# Patient Record
Sex: MALE | Race: WHITE | NOT HISPANIC OR LATINO | URBAN - METROPOLITAN AREA
[De-identification: names, ages, dates, MRNs, and addresses within clinical notes are randomized per-mention and may not be internally consistent; named-entity substitution may affect disease eponyms.]

---

## 2021-10-27 ENCOUNTER — TELEPHONE (OUTPATIENT)
Dept: UROLOGY | Facility: CLINIC | Age: 66
End: 2021-10-27

## 2023-10-13 NOTE — PROGRESS NOTES
10/16/2023      No chief complaint on file. Assessment and Plan    79 y.o. male -- New patient    1. Prostate cancer, [place] 6  - Patient had prostate biopsy in [facility] (4/23/18) for a PSA of 4.3  - PSA (2/16/22) 6.8 when corrected for finasteride  - PSA (9/19/23) 12.50 when corrected for finasteride  - Denies confirmatory biopsy or prostate MRI  - AUDRA today benign  - Repeat PSA  - MRI of prostate for staging and prostate biopsy planning. Fusion guided prostate biopsy vs TRUS biopsy based on MRI findings  - Follow up to review  - Call with any questions or concerns in the meantime  - All questions answered; patient understands and agrees with plan       History of Present Illness  Ilia Constantino is a 79 y.o. male new patient here for evaluation of elevated PSA. Present with daughter for today's visit who provides translation. Patient had elevated PSA in 2018 of 4.3. Patient underwent prostate biopsy in [place] showing 1 core less than 5% positive for [place] 6 prostate cancer. Patient was given the options of active surveillance versus radiation versus prostatectomy. Due to low volume [place] 6, recommendations were to proceed with active surveillance. Patient has been having routine PSA since. Denies confirmatory biopsy or prior MRI of prostate. PSA from 2022 was 6.8 when corrected for finasteride. Most recent PSA 12.50 when corrected for finasteride. Denies gross hematuria, dysuria, flank pain, suprapubic pressure, unexpected weight loss or bone pain. Does have some mild LUTS. Currently taking Flomax and finasteride. Denies family history of  malignancies. Review of Systems   Constitutional:  Negative for activity change, appetite change, chills and fever. HENT:  Negative for congestion and trouble swallowing. Respiratory:  Negative for cough and shortness of breath. Cardiovascular:  Negative for chest pain, palpitations and leg swelling.    Gastrointestinal:  Negative for abdominal pain, constipation, diarrhea, nausea and vomiting. Genitourinary:  Negative for difficulty urinating, dysuria, flank pain, frequency, hematuria and urgency. Musculoskeletal:  Negative for back pain and gait problem. Skin:  Negative for wound. Allergic/Immunologic: Negative for immunocompromised state. Neurological:  Negative for dizziness and syncope. Hematological:  Does not bruise/bleed easily. Psychiatric/Behavioral:  Negative for confusion. All other systems reviewed and are negative. Vitals  Vitals:    10/16/23 1424   BP: 138/78   Pulse: 71   SpO2: 98%   Weight: 105 kg (231 lb)   Height: 5' 11" (1.803 m)       Physical Exam  Constitutional:       General: He is not in acute distress. Appearance: Normal appearance. He is not ill-appearing, toxic-appearing or diaphoretic. HENT:      Head: Normocephalic. Nose: No congestion. Eyes:      General: No scleral icterus. Right eye: No discharge. Left eye: No discharge. Conjunctiva/sclera: Conjunctivae normal.      Pupils: Pupils are equal, round, and reactive to light. Pulmonary:      Effort: Pulmonary effort is normal.   Genitourinary:     Comments: Prostate smooth, symmetrical, no palpable nodules    Musculoskeletal:      Cervical back: Normal range of motion. Skin:     General: Skin is warm and dry. Coloration: Skin is not jaundiced or pale. Findings: No bruising, erythema, lesion or rash. Neurological:      General: No focal deficit present. Mental Status: He is alert and oriented to person, place, and time. Mental status is at baseline. Gait: Gait normal.   Psychiatric:         Mood and Affect: Mood normal.         Behavior: Behavior normal.         Thought Content: Thought content normal.         Judgment: Judgment normal.           Past History  History reviewed. No pertinent past medical history.   Social History     Socioeconomic History    Marital status: /Civil Union Spouse name: None    Number of children: None    Years of education: None    Highest education level: None   Occupational History    None   Tobacco Use    Smoking status: Never     Passive exposure: Past    Smokeless tobacco: Never   Vaping Use    Vaping Use: Never used   Substance and Sexual Activity    Alcohol use: Yes     Comment: occ    Drug use: Never    Sexual activity: Not Currently     Partners: Female   Other Topics Concern    None   Social History Narrative    None     Social Determinants of Health     Financial Resource Strain: Not on file   Food Insecurity: Not on file   Transportation Needs: Not on file   Physical Activity: Not on file   Stress: Not on file   Social Connections: Not on file   Intimate Partner Violence: Not on file   Housing Stability: Not on file     Social History     Tobacco Use   Smoking Status Never    Passive exposure: Past   Smokeless Tobacco Never     Family History   Problem Relation Age of Onset    Colon cancer Father     No Known Problems Mother     Hypertension Brother     Hypertension Sister     Hypertension Sister     Thyroid disease Daughter     No Known Problems Daughter     No Known Problems Son        The following portions of the patient's history were reviewed and updated as appropriate: allergies, current medications, past medical history, past social history, past surgical history and problem list.    Results  No results found for this or any previous visit (from the past 1 hour(s)). ]  No results found for: "PSA"  No results found for: "GLUCOSE", "CALCIUM", "NA", "K", "CO2", "CL", "BUN", "CREATININE"  No results found for: "WBC", "HGB", "HCT", "MCV", "PLT"    Destiney Neff PA-C

## 2023-10-16 ENCOUNTER — OFFICE VISIT (OUTPATIENT)
Dept: UROLOGY | Facility: CLINIC | Age: 68
End: 2023-10-16
Payer: COMMERCIAL

## 2023-10-16 VITALS
SYSTOLIC BLOOD PRESSURE: 138 MMHG | HEIGHT: 71 IN | WEIGHT: 231 LBS | OXYGEN SATURATION: 98 % | BODY MASS INDEX: 32.34 KG/M2 | HEART RATE: 71 BPM | DIASTOLIC BLOOD PRESSURE: 78 MMHG

## 2023-10-16 DIAGNOSIS — C61 PROSTATE CANCER (HCC): Primary | ICD-10-CM

## 2023-10-16 PROCEDURE — 99204 OFFICE O/P NEW MOD 45 MIN: CPT | Performed by: PHYSICIAN ASSISTANT

## 2023-10-16 RX ORDER — FINASTERIDE 5 MG/1
5 TABLET, FILM COATED ORAL DAILY
COMMUNITY

## 2023-10-16 RX ORDER — CANDESARTAN CILEXETIL AND HYDROCHLOROTHIAZIDE 32; 12.5 MG/1; MG/1
1 TABLET ORAL DAILY
COMMUNITY
Start: 2023-09-26

## 2023-10-16 RX ORDER — AMLODIPINE BESYLATE 10 MG/1
10 TABLET ORAL DAILY
COMMUNITY
Start: 2023-09-25

## 2023-10-16 RX ORDER — ATENOLOL 25 MG/1
25 TABLET ORAL DAILY
COMMUNITY
Start: 2023-09-15

## 2023-10-17 ENCOUNTER — TELEPHONE (OUTPATIENT)
Age: 68
End: 2023-10-17

## 2023-10-17 ENCOUNTER — TRANSCRIBE ORDERS (OUTPATIENT)
Age: 68
End: 2023-10-17

## 2023-10-17 DIAGNOSIS — Z12.11 ENCOUNTER FOR SCREENING COLONOSCOPY: Primary | ICD-10-CM

## 2023-11-03 LAB — PSA SERPL-MCNC: 6.35 NG/ML

## 2023-11-09 ENCOUNTER — HOSPITAL ENCOUNTER (OUTPATIENT)
Dept: RADIOLOGY | Age: 68
Discharge: HOME | End: 2023-11-09
Payer: COMMERCIAL

## 2023-11-09 DIAGNOSIS — C61 PROSTATE CANCER (HCC): ICD-10-CM

## 2023-11-09 PROCEDURE — A9585 GADOBUTROL INJECTION: HCPCS | Performed by: PHYSICIAN ASSISTANT

## 2023-11-09 PROCEDURE — 72197 MRI PELVIS W/O & W/DYE: CPT

## 2023-11-09 PROCEDURE — G1004 CDSM NDSC: HCPCS

## 2023-11-09 PROCEDURE — 76377 3D RENDER W/INTRP POSTPROCES: CPT

## 2023-11-09 RX ORDER — GADOBUTROL 604.72 MG/ML
10 INJECTION INTRAVENOUS
Status: COMPLETED | OUTPATIENT
Start: 2023-11-09 | End: 2023-11-09

## 2023-11-09 RX ADMIN — GADOBUTROL 10 ML: 604.72 INJECTION INTRAVENOUS at 15:26

## 2023-11-20 NOTE — PROGRESS NOTES
11/21/2023      Chief Complaint   Patient presents with    Follow-up         Assessment and Plan    79 y.o. male     1. Prostate cancer, James 6  - Patient had prostate biopsy in [facility] (4/23/18) for a PSA of 4.3  - PSA (2/16/22) 6.8 when corrected for finasteride  - PSA (9/19/23) 12.50 when corrected for finasteride  - MRI of prostate (11/9/23) PIRADS 2 with 84 g prostate  - Most recent PSA (11/2/23) 12.70 when corrected for finasteride  - Recommend confirmatory TRUS biopsy given negative MRI and rising PSA. Patient does not take blood thinners. Enema prior to biopsy. Discussed risks and benefit     2. LUTS  - Continue finasteride  -  Trial of Flomax. Prescription sent to pharmacy  - Call with any questions or concerns in the meantime  - All questions answered; patient understands and agrees with plan       History of Present Illness  Holly Oneill is a 79 y.o. male patient with history of James 6 prostate cancer here for follow up. Present with daughter for today's visit who provides translation. Patient had elevated PSA in 2018 of 4.3. Patient underwent prostate biopsy in [place] showing 1 core less than 5% positive for [place] 6 prostate cancer. Patient was given the options of active surveillance versus radiation versus prostatectomy. Due to low volume [place] 6, recommendations were to proceed with active surveillance. Patient has been having routine PSA since. Denies confirmatory biopsy or prior MRI of prostate. PSA from 2022 was 6.8 when corrected for finasteride. Most recent PSA 12.7 when corrected for finasteride. MRI of prostate negative. Currently taking finasteride. Previously tried Flomax with some benefit. Continues to have post void dribbling and would like to restart Flomax. Denies family history of  malignancies. Review of Systems   Constitutional:  Negative for activity change, appetite change, chills and fever. HENT:  Negative for congestion and trouble swallowing. Respiratory:  Negative for cough and shortness of breath. Cardiovascular:  Negative for chest pain, palpitations and leg swelling. Gastrointestinal:  Negative for abdominal pain, constipation, diarrhea, nausea and vomiting. Genitourinary:  Negative for difficulty urinating, dysuria, flank pain, frequency, hematuria and urgency. Musculoskeletal:  Negative for back pain and gait problem. Skin:  Negative for wound. Allergic/Immunologic: Negative for immunocompromised state. Neurological:  Negative for dizziness and syncope. Hematological:  Does not bruise/bleed easily. Psychiatric/Behavioral:  Negative for confusion. All other systems reviewed and are negative. Vitals  Vitals:    11/21/23 1353   BP: 126/70   Pulse: 63   Resp: 16   SpO2: 93%   Weight: 96.2 kg (212 lb)   Height: 5' 11" (1.803 m)       Physical Exam  Constitutional:       General: He is not in acute distress. Appearance: Normal appearance. He is not ill-appearing, toxic-appearing or diaphoretic. HENT:      Head: Normocephalic. Nose: No congestion. Eyes:      General: No scleral icterus. Right eye: No discharge. Left eye: No discharge. Conjunctiva/sclera: Conjunctivae normal.      Pupils: Pupils are equal, round, and reactive to light. Pulmonary:      Effort: Pulmonary effort is normal.   Musculoskeletal:      Cervical back: Normal range of motion. Skin:     General: Skin is warm and dry. Coloration: Skin is not jaundiced or pale. Findings: No bruising, erythema, lesion or rash. Neurological:      General: No focal deficit present. Mental Status: He is alert and oriented to person, place, and time. Mental status is at baseline. Gait: Gait normal.   Psychiatric:         Mood and Affect: Mood normal.         Behavior: Behavior normal.         Thought Content: Thought content normal.         Judgment: Judgment normal.           Past History  History reviewed.  No pertinent past medical history.   Social History     Socioeconomic History    Marital status: /Civil Union     Spouse name: None    Number of children: None    Years of education: None    Highest education level: None   Occupational History    None   Tobacco Use    Smoking status: Never     Passive exposure: Past    Smokeless tobacco: Never   Vaping Use    Vaping Use: Never used   Substance and Sexual Activity    Alcohol use: Yes     Comment: occ    Drug use: Never    Sexual activity: Not Currently     Partners: Female   Other Topics Concern    None   Social History Narrative    None     Social Determinants of Health     Financial Resource Strain: Not on file   Food Insecurity: Not on file   Transportation Needs: Not on file   Physical Activity: Not on file   Stress: Not on file   Social Connections: Not on file   Intimate Partner Violence: Not on file   Housing Stability: Not on file     Social History     Tobacco Use   Smoking Status Never    Passive exposure: Past   Smokeless Tobacco Never     Family History   Problem Relation Age of Onset    Colon cancer Father     No Known Problems Mother     Hypertension Brother     Hypertension Sister     Hypertension Sister     Thyroid disease Daughter     No Known Problems Daughter     No Known Problems Son        The following portions of the patient's history were reviewed and updated as appropriate: allergies, current medications, past medical history, past social history, past surgical history and problem list.    Results  No results found for this or any previous visit (from the past 1 hour(s)).]  Lab Results   Component Value Date    PSA 6.35 (H) 11/02/2023     No results found for: "GLUCOSE", "CALCIUM", "NA", "K", "CO2", "CL", "BUN", "CREATININE"  No results found for: "WBC", "HGB", "HCT", "MCV", "PLT"    Jordyn Bullock PA-C

## 2023-11-21 ENCOUNTER — OFFICE VISIT (OUTPATIENT)
Dept: UROLOGY | Facility: CLINIC | Age: 68
End: 2023-11-21
Payer: COMMERCIAL

## 2023-11-21 VITALS
WEIGHT: 212 LBS | HEART RATE: 63 BPM | RESPIRATION RATE: 16 BRPM | BODY MASS INDEX: 29.68 KG/M2 | OXYGEN SATURATION: 93 % | DIASTOLIC BLOOD PRESSURE: 70 MMHG | SYSTOLIC BLOOD PRESSURE: 126 MMHG | HEIGHT: 71 IN

## 2023-11-21 DIAGNOSIS — N40.1 BENIGN PROSTATIC HYPERPLASIA WITH LOWER URINARY TRACT SYMPTOMS, SYMPTOM DETAILS UNSPECIFIED: Primary | ICD-10-CM

## 2023-11-21 PROCEDURE — 99213 OFFICE O/P EST LOW 20 MIN: CPT | Performed by: PHYSICIAN ASSISTANT

## 2023-11-21 RX ORDER — TAMSULOSIN HYDROCHLORIDE 0.4 MG/1
0.4 CAPSULE ORAL
Qty: 90 CAPSULE | Refills: 3 | Status: SHIPPED | OUTPATIENT
Start: 2023-11-21

## 2023-11-21 RX ORDER — FINASTERIDE 5 MG/1
5 TABLET, FILM COATED ORAL DAILY
Qty: 90 TABLET | Refills: 3 | Status: SHIPPED | OUTPATIENT
Start: 2023-11-21

## 2024-02-28 ENCOUNTER — PROCEDURE VISIT (OUTPATIENT)
Dept: UROLOGY | Facility: CLINIC | Age: 69
End: 2024-02-28
Payer: COMMERCIAL

## 2024-02-28 VITALS
BODY MASS INDEX: 30.1 KG/M2 | HEIGHT: 71 IN | DIASTOLIC BLOOD PRESSURE: 82 MMHG | TEMPERATURE: 96.8 F | RESPIRATION RATE: 16 BRPM | SYSTOLIC BLOOD PRESSURE: 127 MMHG | OXYGEN SATURATION: 97 % | WEIGHT: 215 LBS | HEART RATE: 70 BPM

## 2024-02-28 DIAGNOSIS — C61 PROSTATE CANCER (HCC): Primary | ICD-10-CM

## 2024-02-28 PROCEDURE — 88344 IMHCHEM/IMCYTCHM EA MLT ANTB: CPT | Performed by: STUDENT IN AN ORGANIZED HEALTH CARE EDUCATION/TRAINING PROGRAM

## 2024-02-28 PROCEDURE — G0416 PROSTATE BIOPSY, ANY MTHD: HCPCS | Performed by: STUDENT IN AN ORGANIZED HEALTH CARE EDUCATION/TRAINING PROGRAM

## 2024-02-28 PROCEDURE — 96372 THER/PROPH/DIAG INJ SC/IM: CPT

## 2024-02-28 PROCEDURE — 52000 CYSTOURETHROSCOPY: CPT | Performed by: UROLOGY

## 2024-02-28 RX ORDER — CEFTRIAXONE 1 G/1
1000 INJECTION, POWDER, FOR SOLUTION INTRAMUSCULAR; INTRAVENOUS ONCE
Status: COMPLETED | OUTPATIENT
Start: 2024-02-28 | End: 2024-02-28

## 2024-02-28 RX ADMIN — CEFTRIAXONE 1000 MG: 1 INJECTION, POWDER, FOR SOLUTION INTRAMUSCULAR; INTRAVENOUS at 14:56

## 2024-02-28 NOTE — PATIENT INSTRUCTIONS
Transrectal prostate biopsy post-procedure instructions    You had a transrectal prostate biopsy today. We took tissue biopsy cores of your prostate through multiple needle pokes that went through your rectal mucosa directly into your prostate.    It is normal to have blood in your urine, semen, and stool for the next few days to weeks. Please call office if any of this bleeding is constant or high volume. Also call office if you are passing blood clots in your urine and/or you are not able to urinate.    Some men can have erectile dysfunction for a few weeks after the procedure. If you experience this issue, do not be alarmed. If problem persists after a month or so, let your provider know.    There is a low, but non-zero risk of developing a serious infection after this procedure. You received a strong antibiotic before the procedure to minimize this risk. If you experience fevers, chills, nausea, vomiting, or any other concerning symptoms, call the office or present to ED right away.    Diet    You may resume your regular diet after the procedure.    Please stay hydrated and drink plenty of fluids.    Activity    For the next few days you should try to take it easy and avoid lifting anything heavier than carton of milk. Avoid long car rides for the next few days. Please do your best to not strain when urinating or having a bowel movement, as this can make bleeding worse. Take over the counter stool softener if needed.    For the next few weeks, please do not do anything that puts extra pressure on your perineum. These activities include but are not limited to: bike riding, motorcycle riding, strenuous running/jumping/lifting exercises, horse riding.    About 1 week after you experience no bleeding in urine and stool, you can start to run and do light weight exercises.    It is best to avoid sex or ejaculation for about 1 week after procedure. The first time you ejaculate, you may see a lot of blood in the semen.  Do not be alarmed. This is normal. In subsequent ejaculations, you should see less and less blood. Call office if you have concerns.    Walking is okay and is encouraged after your procedure. Try to not to do too much movement the day of your procedure, but starting the day after procedure, please try to move around.    Hygiene    You can shower as normal starting the day of your procedure. Avoid baths/hot tubs/pools/standing body of water for 5 days after procedure.    If you are having some spot bleeding from your rectum after procedure, you can place gauze or protective pad in your underwear.    Medications    Take over the counter Tylenol as needed for pain or discomfort.    Take all of your other pre-existing medications as scheduled.    Follow-up    You will follow up in the office on 3/15/24 to review your results.

## 2024-02-28 NOTE — PROGRESS NOTES
"  69M with CaP    No AC    Takes Flomax and finasteride for LUTS    Patient had prostate biopsy in [place] (4/23/18) for a PSA of 4.3. Came back G 3+3.    PSA (2/16/22) 6.8 when corrected for finasteride     PSA (9/19/23) 12.50 when corrected for finasteride     MRI of prostate (11/9/23) PIRADS 2 with 84 g prostate     PSA (11/2/23) 12.70 when corrected for finasteride              Cystoscopy     Date/Time  2/28/2024 3:00 PM     Performed by  Adarsh Trevino DO   Authorized by  Adarsh Trevino DO     Universal Protocol:  Consent: Verbal consent obtained. Written consent obtained.  Risks and benefits: risks, benefits and alternatives were discussed  Consent given by: patient  Time out: Immediately prior to procedure a \"time out\" was called to verify the correct patient, procedure, equipment, support staff and site/side marked as required.  Timeout called at: 2/28/2024 3:08 PM.  Patient understanding: patient states understanding of the procedure being performed  Patient consent: the patient's understanding of the procedure matches consent given  Procedure consent: procedure consent matches procedure scheduled  Relevant documents: relevant documents present and verified  Required items: required blood products, implants, devices, and special equipment available  Patient identity confirmed: verbally with patient      Procedure Details:  Procedure type: cystoscopy    Patient tolerance: Patient tolerated the procedure well with no immediate complications    Additional Procedure Details: Office TRUS-guided Prostate Biopsy Procedure Note    Indication    Active surveillance of prostate cancer    Informed consent   The risks, benefits and alternatives to this procedures were discussed with the patient and he has participated in the informed consent process and wishes to proceed    Antibiotic prophylaxis   Ancef    Rectal cleansing  The patient was instructed to perform an evacuating rectal enema 1-2 hours prior to " biopsy.    Local anesthesia  Topical 2% lidocaine jelly was applied liberally to the anus and rectum and allowed to dwell for at least 5 min prior to starting the procedure.  After insertion of the TRUS probe, 10 mL of 2% lidocaine solution was injected with ultrasound guidance at the  junction of the prostate and seminal vesicles. The anesthetic was allowed to dwell for at least 2 minutes prior to biopsy.    Transrectal ultrasonography  The patient was placed in the left lateral decubitus position. After an attentive digital rectal examination, a 7.5 mHz sidefire ultrasound probe was gently inserted into the rectum and biplanar imaging of the prostate was done with the findings noted below. Images were taken of any abnormal findings and also to document prostate size.    Bladder  The bladder base appeared normal.    Prostate      Ultrasound size measurements:  -Volume:  63.52 cm3    Ultrasound findings:  -Cysts: None  -Masses: None  -Median lobe: absent    Clinical stage (assuming a positive biopsy):   -T1c.    TRUS-guided needle biopsy  Using an 18 gauge biopsy needle and ultrasound guidance, the following biopsies were taken:    1 core(s) from the left lateral base.  1 core(s) from the left lateral mid-gland.  1 core(s) from the right middle base.  1 core(s) from the right lateral base.  1 core(s) from the left lateral mid-gland.  1 core(s) from the left middle mid-gland.  1 core(s) from the right middle mid-gland.  1 core(s) from the right lateral mid-gland.  1 core(s) from the left lateral apex.  1 core(s) from the left middle apex.  1 core(s) from the right middle apex.  1 core(s) from the right lateral apex    Total number of cores: 12                Complications  There were no procedural complications.    Disposition  The patient was dismissed to home     Post-procedure instructions:     Today he underwent an uncomplicated transrectal ultrasound-guided biopsy of the prostate, following a bo-prostatic  nerve block.I reviewed the normal post-procedure course including bleeding per rectum, hematuria, and hematospermia.  . Instructed him to call with fever greater than 101, chills, nausea, vomiting, and poorly controlled pain. His followup was scheduled in 2 to 4 weeks' time to review the pathology.                        PLAN  -Sees me for path review 3/15/24. Will also discuss voiding and Flomax/finasteride at that time.

## 2024-03-01 PROCEDURE — G0416 PROSTATE BIOPSY, ANY MTHD: HCPCS | Performed by: STUDENT IN AN ORGANIZED HEALTH CARE EDUCATION/TRAINING PROGRAM

## 2024-03-01 PROCEDURE — 88344 IMHCHEM/IMCYTCHM EA MLT ANTB: CPT | Performed by: STUDENT IN AN ORGANIZED HEALTH CARE EDUCATION/TRAINING PROGRAM

## 2024-03-15 ENCOUNTER — OFFICE VISIT (OUTPATIENT)
Dept: UROLOGY | Facility: CLINIC | Age: 69
End: 2024-03-15
Payer: COMMERCIAL

## 2024-03-15 VITALS
HEIGHT: 71 IN | WEIGHT: 215 LBS | HEART RATE: 61 BPM | BODY MASS INDEX: 30.1 KG/M2 | OXYGEN SATURATION: 98 % | TEMPERATURE: 97.5 F | SYSTOLIC BLOOD PRESSURE: 122 MMHG | DIASTOLIC BLOOD PRESSURE: 68 MMHG

## 2024-03-15 DIAGNOSIS — C61 PROSTATE CANCER (HCC): Primary | ICD-10-CM

## 2024-03-15 PROCEDURE — 99214 OFFICE O/P EST MOD 30 MIN: CPT | Performed by: UROLOGY

## 2024-03-15 NOTE — PROGRESS NOTES
"UROLOGY FOLLOW-UP ENCOUNTER    Jason Aguero is a 69 y.o. male with prostate cancer    No AC     Takes Flomax and finasteride for LUTS     Patient had prostate biopsy in [place] (4/23/18) for a PSA of 4.3. Came back G 3+3.     PSA (2/16/22) 6.8 when corrected for finasteride      PSA (9/19/23) 12.50 when corrected for finasteride      MRI of prostate (11/9/23) PIRADS 2 with 84 g prostate      PSA (11/2/23) 12.70 when corrected for finasteride     TRUS Pbx 2/28/24: no cancer      Assessment and plan:     Prostate cancer    Patient with prostate cancer on active surveillance.  He had [place] 6 from biopsy in 2018.    We reviewed his most recent prostate biopsy results.  Explained that none of the Demetrius came back for cancer.  I explained that even though he did not have positive cancer his biopsies, it does not mean that he does not still have prostate cancer.  We will therefore keep him on the active surveillance protocol as is standard for low risk prostate cancer.    We discussed possibly stretching out his interval surveillance since he has been negative for progression 2018.  Since I will plan on doing a PSA at the 6-month interval, which will be roughly June or July 2024.  After that, we will plan on doing prostate MRI 6 months after that, followed by PSA in 6 months, followed by another prostate biopsy.  This will therefore put him on a regimen of getting a prostate biopsy every other year rather than every year.          PLAN  -He will come back to clinic with PSA in about 4 months.  We will then plan on getting prostate MRI and PSA about 6 months after that. He will then get PSA 6 months later, followed by PSA and repeat TRUS biopsy 6 months later.  -Cont Flomax and finasteride      Patient's daughter, Talya, present in office today and assisted with history      Portions of the above record have been created with voice recognition software.  Occasional wrong word or \"sound alike\" substitution may have occurred due " "to the inherent limitations of voice recognition software.  Read the chart carefully and recognize, using context, where substitution may have occurred.      Adarsh Trevino DO        Chief Complaint     Prostate cancer      History of Present Illness     See summary above    Feeling well since biopsy      The following portions of the patient's history were reviewed and updated as appropriate: allergies, current medications, past family history, past medical history, past social history, past surgical history and problem list.          Review of Systems     Review of Systems   Constitutional:  Negative for chills and fever.   Respiratory:  Negative for cough and shortness of breath.    Genitourinary:  Negative for dysuria and hematuria.   Neurological:  Negative for dizziness and headaches.   Psychiatric/Behavioral:  Negative for agitation and behavioral problems.            Allergies     No Known Allergies    Physical Exam     Physical Exam  Constitutional:       General: He is not in acute distress.  HENT:      Head: Normocephalic and atraumatic.   Pulmonary:      Effort: Pulmonary effort is normal. No respiratory distress.   Abdominal:      General: Abdomen is flat.      Palpations: Abdomen is soft.      Tenderness: There is no right CVA tenderness or left CVA tenderness.   Skin:     General: Skin is warm and dry.   Neurological:      General: No focal deficit present.      Mental Status: He is alert and oriented to person, place, and time.   Psychiatric:         Mood and Affect: Mood normal.         Behavior: Behavior normal.             Vital Signs  Vitals:    03/15/24 1400   BP: 122/68   Pulse: 61   Temp: 97.5 °F (36.4 °C)   TempSrc: Temporal   SpO2: 98%   Weight: 97.5 kg (215 lb)   Height: 5' 11\" (1.803 m)         Current Medications       Current Outpatient Medications:     amLODIPine (NORVASC) 10 mg tablet, Take 10 mg by mouth daily, Disp: , Rfl:     finasteride (PROSCAR) 5 mg tablet, Take 1 tablet " (5 mg total) by mouth daily, Disp: 90 tablet, Rfl: 3    tamsulosin (FLOMAX) 0.4 mg, Take 1 capsule (0.4 mg total) by mouth daily with dinner, Disp: 90 capsule, Rfl: 3    atenolol (TENORMIN) 25 mg tablet, Take 25 mg by mouth daily (Patient not taking: Reported on 10/16/2023), Disp: , Rfl:     candesartan-hydrochlorothiazide (ATACAND HCT) 32-12.5 MG per tablet, Take 1 tablet by mouth daily (Patient not taking: Reported on 10/16/2023), Disp: , Rfl:       Active Problems     There is no problem list on file for this patient.        Past Medical History     History reviewed. No pertinent past medical history.      Surgical History     History reviewed. No pertinent surgical history.      Family History     Family History   Problem Relation Age of Onset    Colon cancer Father     No Known Problems Mother     Hypertension Brother     Hypertension Sister     Hypertension Sister     Thyroid disease Daughter     No Known Problems Daughter     No Known Problems Son          Social History     Social History     Social History     Tobacco Use   Smoking Status Never    Passive exposure: Past   Smokeless Tobacco Never         Pertinent Lab Values     Lab Results   Component Value Date    CREATININE 0.83 02/16/2022       Lab Results   Component Value Date    PSA 6.35 (H) 11/02/2023               Pertinent Imaging     The patient's images were reviewed by me personally and also in real time with them in the examination room using our PACS imaging system.            Pertinent Pathology     TRUS Pbx 2/28/24: no cancer  A. Prostate, LLB:  -   Benign prostatic tissue.      B. Prostate, LLM:  -   Benign prostatic tissue.      C. Prostate, LLA:  -   Benign prostatic tissue mild mixed inflammation.     D. Prostate, LB:  -   Benign prostatic tissue mild mixed inflammation.     E. Prostate, LM:  -   Small atypical acinar proliferation, favor partial atrophy, see comment.     Comment: Immunohistochemistry for a prostate multiplex stain (p63,  , and P504S) demonstrates a rare single gland without staining (negative for p63//P504S) this small focus is favored to represent partial atrophy.         F. Prostate, LA:  -   Benign prostatic tissue with chronic inflammation.      Comment: Immunohistochemistry for a prostate multiplex stain (p63, , and P504S) demonstrates benign epithelial elements.     G. Prostate, RB:  -   Benign prostatic tissue with acute and chronic inflammation.     Comment: Immunohistochemistry for a prostate multiplex stain (p63, , and P504S) demonstrates benign epithelial elements.     H. Prostate, RM:  -   Benign prostatic tissue mild mixed inflammation.     Comment: Immunohistochemistry for a prostate multiplex stain (p63, , and P504S) demonstrates benign epithelial elements.     I. Prostate, RA:  -   Benign prostatic tissue with acute and chronic inflammation.      J. Prostate, RLB:  -   Benign prostatic tissue with chronic inflammation.       K. Prostate, RLM:  -   Benign prostatic tissue.       L. Prostate, RLA:  -   Benign prostatic tissue with chronic inflammation.         I have spent 30 minutes with Patient and family today in which greater than 50% of this time was spent in counseling/coordination of care regarding Diagnostic results, Prognosis, Risks and benefits of tx options, Instructions for management, Patient and family education, Importance of tx compliance, Impressions, Counseling / Coordination of care, Documenting in the medical record, Reviewing / ordering tests, medicine, procedures  , and Obtaining or reviewing history  .    Please note this time includes cumulative time on the day of encounter, including reviewing medical records and/or coordinating care among the patient's other specialists.

## 2024-07-10 LAB — PSA SERPL-MCNC: 4.53 NG/ML

## 2024-07-12 NOTE — PROGRESS NOTES
7/15/2024      Chief Complaint   Patient presents with   • Follow-up         Assessment and Plan    69 y.o. male     1. Prostate cancer, [place] 6  - Patient had prostate biopsy in [place] (4/23/18) for a PSA of 4.3  - mpMRI November 2023 negative PIRADS 2  - prostate biopsy 2/28/24 negative  - PSA (7/9/24) 9 when corrected for finasteride  - Follow up in 6 months with PSA and MRI prior to visit. AUDRA at next visit. Will repeat TRUS biopsy and another PSA 6 months later   - Call with any questions or concerns in the meantime  - All questions answered; patient understands and agrees with plan       History of Present Illness  Jason Aguero is a 69 y.o. male patient with history of [place] 6 prostate cancer on active surveillance here for follow up.     Present with daughter for today's visit who provides translation.     Patient had elevated PSA in 2018 of 4.3.  Patient underwent prostate biopsy in [place] showing 1 core less than 5% positive for [place] 6 prostate cancer.  Patient was given the options of active surveillance versus radiation versus prostatectomy.  Due to low volume [place] 6, recommendations were to proceed with active surveillance.  Patient has been having routine PSA since.  patient had negative MRI in November 2023, however, persistent rise in PSA. Underwent prostate biopsy in February 2024 which was negative. Presents today with PSA of 9. Will continue active surveillance.     Review of Systems   Constitutional:  Negative for activity change, appetite change, chills and fever.   HENT:  Negative for congestion and trouble swallowing.    Respiratory:  Negative for cough and shortness of breath.    Cardiovascular:  Negative for chest pain, palpitations and leg swelling.   Gastrointestinal:  Negative for abdominal pain, constipation, diarrhea, nausea and vomiting.   Genitourinary:  Negative for difficulty urinating, dysuria, flank pain, frequency, hematuria and urgency.   Musculoskeletal:  Negative for  "back pain and gait problem.   Skin:  Negative for wound.   Allergic/Immunologic: Negative for immunocompromised state.   Neurological:  Negative for dizziness and syncope.   Hematological:  Does not bruise/bleed easily.   Psychiatric/Behavioral:  Negative for confusion.    All other systems reviewed and are negative.      Vitals  Vitals:    07/15/24 1402   BP: 126/74   Pulse: 65   Temp: 98.2 °F (36.8 °C)   TempSrc: Temporal   SpO2: 99%   Weight: 98.4 kg (217 lb)   Height: 5' 11\" (1.803 m)       Physical Exam  Constitutional:       General: He is not in acute distress.     Appearance: Normal appearance. He is not ill-appearing, toxic-appearing or diaphoretic.   HENT:      Head: Normocephalic.      Nose: No congestion.   Eyes:      General: No scleral icterus.        Right eye: No discharge.         Left eye: No discharge.      Conjunctiva/sclera: Conjunctivae normal.      Pupils: Pupils are equal, round, and reactive to light.   Pulmonary:      Effort: Pulmonary effort is normal.   Musculoskeletal:      Cervical back: Normal range of motion.   Skin:     General: Skin is warm and dry.      Coloration: Skin is not jaundiced or pale.      Findings: No bruising, erythema, lesion or rash.   Neurological:      General: No focal deficit present.      Mental Status: He is alert and oriented to person, place, and time. Mental status is at baseline.      Gait: Gait normal.   Psychiatric:         Mood and Affect: Mood normal.         Behavior: Behavior normal.         Thought Content: Thought content normal.         Judgment: Judgment normal.         Past History  History reviewed. No pertinent past medical history.  Social History     Socioeconomic History   • Marital status: /Civil Union     Spouse name: None   • Number of children: None   • Years of education: None   • Highest education level: None   Occupational History   • None   Tobacco Use   • Smoking status: Never     Passive exposure: Past   • Smokeless tobacco: " "Never   Vaping Use   • Vaping status: Never Used   Substance and Sexual Activity   • Alcohol use: Yes     Comment: occ   • Drug use: Never   • Sexual activity: Not Currently     Partners: Female   Other Topics Concern   • None   Social History Narrative   • None     Social Determinants of Health     Financial Resource Strain: Not on file   Food Insecurity: Not on file   Transportation Needs: Not on file   Physical Activity: Not on file   Stress: Not on file   Social Connections: Not on file   Intimate Partner Violence: Not on file   Housing Stability: Not on file     Social History     Tobacco Use   Smoking Status Never   • Passive exposure: Past   Smokeless Tobacco Never     Family History   Problem Relation Age of Onset   • Colon cancer Father    • No Known Problems Mother    • Hypertension Brother    • Hypertension Sister    • Hypertension Sister    • Thyroid disease Daughter    • No Known Problems Daughter    • No Known Problems Son        The following portions of the patient's history were reviewed and updated as appropriate: allergies, current medications, past medical history, past social history, past surgical history and problem list.    Results  No results found for this or any previous visit (from the past 1 hour(s)).]  Lab Results   Component Value Date    PSA 4.53 (H) 07/09/2024    PSA 6.35 (H) 11/02/2023     Lab Results   Component Value Date    CALCIUM 9.2 02/16/2022    K 4.4 02/16/2022    CO2 26 02/16/2022     02/16/2022    BUN 17 02/16/2022    CREATININE 0.83 02/16/2022     No results found for: \"WBC\", \"HGB\", \"HCT\", \"MCV\", \"PLT\"    Shaila Carballo PA-C  "

## 2024-07-15 ENCOUNTER — OFFICE VISIT (OUTPATIENT)
Dept: UROLOGY | Facility: CLINIC | Age: 69
End: 2024-07-15
Payer: COMMERCIAL

## 2024-07-15 VITALS
OXYGEN SATURATION: 99 % | SYSTOLIC BLOOD PRESSURE: 126 MMHG | DIASTOLIC BLOOD PRESSURE: 74 MMHG | BODY MASS INDEX: 30.38 KG/M2 | TEMPERATURE: 98.2 F | HEIGHT: 71 IN | HEART RATE: 65 BPM | WEIGHT: 217 LBS

## 2024-07-15 DIAGNOSIS — C61 PROSTATE CANCER (HCC): Primary | ICD-10-CM

## 2024-07-15 DIAGNOSIS — N40.1 BENIGN PROSTATIC HYPERPLASIA WITH LOWER URINARY TRACT SYMPTOMS, SYMPTOM DETAILS UNSPECIFIED: ICD-10-CM

## 2024-07-15 PROCEDURE — 99213 OFFICE O/P EST LOW 20 MIN: CPT | Performed by: PHYSICIAN ASSISTANT

## 2024-07-15 RX ORDER — FINASTERIDE 5 MG/1
5 TABLET, FILM COATED ORAL DAILY
Qty: 90 TABLET | Refills: 3 | Status: SHIPPED | OUTPATIENT
Start: 2024-07-15

## 2024-07-15 RX ORDER — TAMSULOSIN HYDROCHLORIDE 0.4 MG/1
0.4 CAPSULE ORAL
Qty: 90 CAPSULE | Refills: 3 | Status: SHIPPED | OUTPATIENT
Start: 2024-07-15

## 2024-12-05 LAB
BUN SERPL-MCNC: 17 MG/DL (ref 7–25)
BUN/CREAT SERPL: ABNORMAL (CALC) (ref 6–22)
CALCIUM SERPL-MCNC: 9.4 MG/DL (ref 8.6–10.3)
CHLORIDE SERPL-SCNC: 102 MMOL/L (ref 98–110)
CO2 SERPL-SCNC: 28 MMOL/L (ref 20–32)
CREAT SERPL-MCNC: 1.04 MG/DL (ref 0.7–1.35)
GFR/BSA.PRED SERPLBLD CYS-BASED-ARV: 79 ML/MIN/1.73M2
GLUCOSE SERPL-MCNC: 106 MG/DL (ref 65–99)
POTASSIUM SERPL-SCNC: 4.1 MMOL/L (ref 3.5–5.3)
PSA SERPL-MCNC: 4.05 NG/ML
SODIUM SERPL-SCNC: 138 MMOL/L (ref 135–146)

## 2024-12-10 ENCOUNTER — TELEPHONE (OUTPATIENT)
Dept: ADMINISTRATIVE | Facility: HOSPITAL | Age: 69
End: 2024-12-10

## 2024-12-17 ENCOUNTER — HOSPITAL ENCOUNTER (OUTPATIENT)
Dept: RADIOLOGY | Age: 69
Discharge: HOME | End: 2024-12-17
Payer: COMMERCIAL

## 2024-12-17 DIAGNOSIS — C61 PROSTATE CANCER (HCC): ICD-10-CM

## 2024-12-17 PROCEDURE — A9585 GADOBUTROL INJECTION: HCPCS | Performed by: PHYSICIAN ASSISTANT

## 2024-12-17 PROCEDURE — 76377 3D RENDER W/INTRP POSTPROCES: CPT

## 2024-12-17 PROCEDURE — 72197 MRI PELVIS W/O & W/DYE: CPT

## 2024-12-17 RX ORDER — GADOBUTROL 604.72 MG/ML
10 INJECTION INTRAVENOUS
Status: COMPLETED | OUTPATIENT
Start: 2024-12-17 | End: 2024-12-17

## 2024-12-17 RX ADMIN — GADOBUTROL 10 ML: 604.72 INJECTION INTRAVENOUS at 14:41

## 2025-01-14 ENCOUNTER — OFFICE VISIT (OUTPATIENT)
Dept: UROLOGY | Facility: CLINIC | Age: 70
End: 2025-01-14
Payer: COMMERCIAL

## 2025-01-14 VITALS
HEIGHT: 71 IN | DIASTOLIC BLOOD PRESSURE: 72 MMHG | WEIGHT: 222 LBS | BODY MASS INDEX: 31.08 KG/M2 | SYSTOLIC BLOOD PRESSURE: 136 MMHG | TEMPERATURE: 98.6 F | OXYGEN SATURATION: 99 % | HEART RATE: 67 BPM

## 2025-01-14 DIAGNOSIS — C61 PROSTATE CANCER (HCC): Primary | ICD-10-CM

## 2025-01-14 PROCEDURE — 99213 OFFICE O/P EST LOW 20 MIN: CPT | Performed by: PHYSICIAN ASSISTANT

## 2025-01-14 NOTE — PROGRESS NOTES
Name: Jason Aguero      : 1955      MRN: [id number]  Encounter Provider: Shaila Carballo PA-C  Encounter Date: 2025   Encounter department: [facility] UROLOG[facility] [place]  :  Assessment & Plan  Prostate cancer (HCC)  Diagnosed in , G6 prostate cancer. PSA at that time 4.3  mpMRI 2023 negative PIRADS 2  prostate biopsy 24 negative  mpMRI 24 PIRADS 2, 73 g prostate  Stable PSA 4.05  PSA and follow up in 6 months. Next mpMRI due in 2026  Orders:    PSA Total, Diagnostic; Future        History of Present Illness   Jason Aguero is a 70 y.o. male who presents for follow up. Patient had elevated PSA in  of 4.3.  Patient underwent prostate biopsy in [place] showing 1 core less than 5% positive for [place] 6 prostate cancer.  Patient was given the options of active surveillance versus radiation versus prostatectomy.  Due to low volume [place] 6, recommendations were to proceed with active surveillance.  Patient has been having routine PSA since.  patient had negative MRI in 2023, however, persistent rise in PSA. Underwent prostate biopsy in 2024 which was negative. Presents today with PSA of 4.05. PIRADS 2 on MRI in 2024. Declines symptoms at this time. Continues to take dual medication.     Review of Systems   Constitutional:  Negative for activity change, appetite change, chills and fever.   HENT:  Negative for congestion and trouble swallowing.    Respiratory:  Negative for cough and shortness of breath.    Cardiovascular:  Negative for chest pain, palpitations and leg swelling.   Gastrointestinal:  Negative for abdominal pain, constipation, diarrhea, nausea and vomiting.   Genitourinary:  Negative for difficulty urinating, dysuria, flank pain, frequency, hematuria and urgency.   Musculoskeletal:  Negative for back pain and gait problem.   Skin:  Negative for wound.   Allergic/Immunologic: Negative for immunocompromised state.  "  Neurological:  Negative for dizziness and syncope.   Hematological:  Does not bruise/bleed easily.   Psychiatric/Behavioral:  Negative for confusion.    All other systems reviewed and are negative.         Objective   /72 (Patient Position: Sitting, Cuff Size: Standard)   Pulse 67   Temp 98.6 °F (37 °C) (Temporal)   Ht 5' 11\" (1.803 m)   Wt 101 kg (222 lb)   SpO2 99%   BMI 30.96 kg/m²     Physical Exam  Constitutional:       Appearance: Normal appearance.   HENT:      Head: Normocephalic.      Nose: Nose normal.      Mouth/Throat:      Pharynx: Oropharynx is clear.   Eyes:      Extraocular Movements: Extraocular movements intact.      Pupils: Pupils are equal, round, and reactive to light.   Pulmonary:      Effort: Pulmonary effort is normal.   Musculoskeletal:         General: Normal range of motion.      Cervical back: Normal range of motion.   Skin:     General: Skin is warm.   Neurological:      General: No focal deficit present.      Mental Status: He is alert and oriented to person, place, and time. Mental status is at baseline.   Psychiatric:         Mood and Affect: Mood normal.         Behavior: Behavior normal.         Thought Content: Thought content normal.         Judgment: Judgment normal.          Results  Lab Results   Component Value Date    PSA 4.05 (H) 12/04/2024    PSA 4.53 (H) 07/09/2024    PSA 6.35 (H) 11/02/2023     Lab Results   Component Value Date    CALCIUM 9.4 12/04/2024    K 4.1 12/04/2024    CO2 28 12/04/2024     12/04/2024    BUN 17 12/04/2024    CREATININE 1.04 12/04/2024     No results found for: \"WBC\", \"HGB\", \"HCT\", \"MCV\", \"PLT\"    Office Urine Dip  No results found for this or any previous visit (from the past hour).]      "

## 2025-07-07 ENCOUNTER — TELEPHONE (OUTPATIENT)
Dept: UROLOGY | Facility: CLINIC | Age: 70
End: 2025-07-07

## 2025-07-07 NOTE — TELEPHONE ENCOUNTER
Called and left VM for the PT. PSA is needed PTV with Lien WHITLOCK on 7/16/25. Office number elft for the PT if any questions.

## 2025-07-17 ENCOUNTER — RESULTS FOLLOW-UP (OUTPATIENT)
Dept: UROLOGY | Facility: CLINIC | Age: 70
End: 2025-07-17

## 2025-07-17 DIAGNOSIS — C61 PROSTATE CANCER (HCC): Primary | ICD-10-CM

## 2025-07-17 LAB — PSA SERPL-MCNC: 7.5 NG/ML

## 2025-07-17 NOTE — TELEPHONE ENCOUNTER
Tried calling pt - no answer or v/m(full)   When pt calls back please advise him of Lien's message

## 2025-07-18 ENCOUNTER — TELEPHONE (OUTPATIENT)
Dept: UROLOGY | Facility: CLINIC | Age: 70
End: 2025-07-18

## 2025-07-18 NOTE — TELEPHONE ENCOUNTER
Me  LV    7/17/25 10:46 AM  Note     Tried calling pt - no answer or v/m(full)   When pt calls back please advise him of Lien's message         Me to Jason Aguero        7/17/25 10:45 AM  no v/m    7/17/25 10:11 AM  Nicole Villela MA routed this conversation to Me  Bernie Burks  (Selected Message)  View older events    Sarah Schnitzler, PA-C to [facility]       7/17/25  8:22 AM  Result Note  Patient missed appt yesterday. PSA increased to 7.5. Surveillance TRUS prostate biopsy was recommended to be scheduled at the beginning of 2026. Would repeat PSA in 6-8 weeks. If ongoing increase, biopsy will need to be scheduled sooner. If he has additional questions, can reschedule his missed follow up  PSA, Total Screen

## 2025-07-18 NOTE — TELEPHONE ENCOUNTER
Spoke with pts daughter , advised of Lien's message. She asks we mail the PSA order to the home address . Mailed